# Patient Record
Sex: FEMALE | Race: WHITE | NOT HISPANIC OR LATINO | URBAN - METROPOLITAN AREA
[De-identification: names, ages, dates, MRNs, and addresses within clinical notes are randomized per-mention and may not be internally consistent; named-entity substitution may affect disease eponyms.]

---

## 2018-07-01 ENCOUNTER — OUTPATIENT (OUTPATIENT)
Dept: OUTPATIENT SERVICES | Facility: HOSPITAL | Age: 28
LOS: 1 days | End: 2018-07-01
Payer: MEDICAID

## 2018-07-01 PROCEDURE — G9001: CPT

## 2018-07-17 ENCOUNTER — EMERGENCY (EMERGENCY)
Age: 28
LOS: 1 days | Discharge: HOME | End: 2018-07-17
Admitting: EMERGENCY MEDICINE
Payer: MEDICAID

## 2018-07-17 ENCOUNTER — INPATIENT (INPATIENT)
Age: 28
LOS: 0 days | Discharge: HOME | End: 2018-07-18
Attending: NEUROLOGICAL SURGERY | Admitting: NEUROLOGICAL SURGERY

## 2018-07-17 VITALS
DIASTOLIC BLOOD PRESSURE: 70 MMHG | SYSTOLIC BLOOD PRESSURE: 98 MMHG | OXYGEN SATURATION: 99 % | TEMPERATURE: 99 F | RESPIRATION RATE: 16 BRPM | HEART RATE: 83 BPM

## 2018-07-17 VITALS
SYSTOLIC BLOOD PRESSURE: 107 MMHG | TEMPERATURE: 98 F | RESPIRATION RATE: 18 BRPM | OXYGEN SATURATION: 100 % | HEART RATE: 66 BPM | DIASTOLIC BLOOD PRESSURE: 69 MMHG

## 2018-07-17 PROCEDURE — 12001 RPR S/N/AX/GEN/TRNK 2.5CM/<: CPT | Mod: F6

## 2018-07-17 PROCEDURE — 99283 EMERGENCY DEPT VISIT LOW MDM: CPT | Mod: 25

## 2018-07-17 NOTE — ED PEDIATRIC TRIAGE NOTE - CHIEF COMPLAINT QUOTE
Patient bit by own pit-bull while attempting to free patient's daughter from pit-bull's bite. Per patient, dog "up to date on shots". Patient a&ox3, laceration sustained to base of right second finger, wrapped with gauze per EMS, dry and intact. No active bleeding. No PMH

## 2018-07-17 NOTE — ED ADULT TRIAGE NOTE - CHIEF COMPLAINT QUOTE
C/o laceration to base of right second finger after trying to separate her pitbull from biting her daughter. Dog is up to date with shots.

## 2018-07-18 DIAGNOSIS — T14.8XXA OTHER INJURY OF UNSPECIFIED BODY REGION, INITIAL ENCOUNTER: ICD-10-CM

## 2018-07-18 RX ADMIN — Medication 875 MILLIGRAM(S): at 00:47

## 2018-07-18 NOTE — ED PROVIDER NOTE - MEDICAL DECISION MAKING DETAILS
Pt p/w laceration to finger secondary to dog bite, copiously irrigated, Augmentin, loosely closed w/ [id number], encourage f/u in x2days.

## 2018-07-18 NOTE — ED PROVIDER NOTE - SKIN WOUND DESCRIPTION
1.5cm laceration to the base of the 2nd finger of the rt hand, no joint involvement, no signs of tendon injury, motor sensation intact, cap refill <2sec, FROM

## 2018-07-18 NOTE — ED PROVIDER NOTE - OBJECTIVE STATEMENT
28 y/o F w/ no significant PMHx presenting s/p dog bite to base of 2nd digit of the rt hand. Pt notes earlier tonight she was bitten by her domestic pitbull, vaccinations are UTD. Suffered laceration to finger, bleeding controlled. No weakness or numbness. No other injuries. Last Tetanus was [id number] ago. Pt is rt hand dominant.

## 2018-07-30 DIAGNOSIS — Z71.89 OTHER SPECIFIED COUNSELING: ICD-10-CM

## 2019-01-02 ENCOUNTER — APPOINTMENT (OUTPATIENT)
Dept: OBGYN | Facility: HOSPITAL | Age: 29
End: 2019-01-02

## 2020-05-08 ENCOUNTER — TRANSCRIPTION ENCOUNTER (OUTPATIENT)
Age: 30
End: 2020-05-08

## 2021-05-04 ENCOUNTER — APPOINTMENT (OUTPATIENT)
Dept: GASTROENTEROLOGY | Facility: CLINIC | Age: 31
End: 2021-05-04
Payer: MEDICAID

## 2021-05-04 VITALS
HEART RATE: 80 BPM | DIASTOLIC BLOOD PRESSURE: 60 MMHG | SYSTOLIC BLOOD PRESSURE: 100 MMHG | WEIGHT: 95.13 LBS | OXYGEN SATURATION: 97 % | HEIGHT: 61 IN | BODY MASS INDEX: 17.96 KG/M2 | TEMPERATURE: 98.4 F

## 2021-05-04 DIAGNOSIS — F41.8 OTHER SPECIFIED ANXIETY DISORDERS: ICD-10-CM

## 2021-05-04 DIAGNOSIS — Z80.1 FAMILY HISTORY OF MALIGNANT NEOPLASM OF TRACHEA, BRONCHUS AND LUNG: ICD-10-CM

## 2021-05-04 DIAGNOSIS — Z83.3 FAMILY HISTORY OF DIABETES MELLITUS: ICD-10-CM

## 2021-05-04 DIAGNOSIS — Z78.9 OTHER SPECIFIED HEALTH STATUS: ICD-10-CM

## 2021-05-04 DIAGNOSIS — Z80.0 FAMILY HISTORY OF MALIGNANT NEOPLASM OF DIGESTIVE ORGANS: ICD-10-CM

## 2021-05-04 DIAGNOSIS — R63.4 ABNORMAL WEIGHT LOSS: ICD-10-CM

## 2021-05-04 DIAGNOSIS — Z80.8 FAMILY HISTORY OF MALIGNANT NEOPLASM OF OTHER ORGANS OR SYSTEMS: ICD-10-CM

## 2021-05-04 DIAGNOSIS — Z83.79 FAMILY HISTORY OF OTHER DISEASES OF THE DIGESTIVE SYSTEM: ICD-10-CM

## 2021-05-04 DIAGNOSIS — Z82.49 FAMILY HISTORY OF ISCHEMIC HEART DISEASE AND OTHER DISEASES OF THE CIRCULATORY SYSTEM: ICD-10-CM

## 2021-05-04 DIAGNOSIS — Z80.7 FAMILY HISTORY OF OTHER MALIGNANT NEOPLASMS OF LYMPHOID, HEMATOPOIETIC AND RELATED TISSUES: ICD-10-CM

## 2021-05-04 PROCEDURE — 99072 ADDL SUPL MATRL&STAF TM PHE: CPT

## 2021-05-04 PROCEDURE — 99202 OFFICE O/P NEW SF 15 MIN: CPT

## 2021-05-04 NOTE — PHYSICAL EXAM
[General Appearance - Alert] : alert [General Appearance - Well Developed] : well developed [Sclera] : the sclera and conjunctiva were normal [PERRL With Normal Accommodation] : pupils were equal in size, round, and reactive to light [Extraocular Movements] : extraocular movements were intact [Outer Ear] : the ears and nose were normal in appearance [Oropharynx] : the oropharynx was normal [Neck Appearance] : the appearance of the neck was normal [Neck Cervical Mass (___cm)] : no neck mass was observed [Jugular Venous Distention Increased] : there was no jugular-venous distention [Thyroid Diffuse Enlargement] : the thyroid was not enlarged [Thyroid Nodule] : there were no palpable thyroid nodules [Auscultation Breath Sounds / Voice Sounds] : lungs were clear to auscultation bilaterally [Heart Rate And Rhythm] : heart rate was normal and rhythm regular [Heart Sounds] : normal S1 and S2 [Heart Sounds Gallop] : no gallops [Murmurs] : no murmurs [Heart Sounds Pericardial Friction Rub] : no pericardial rub [Full Pulse] : the pedal pulses are present [Edema] : there was no peripheral edema [Bowel Sounds] : normal bowel sounds [Abdomen Soft] : soft [Abdomen Tenderness] : non-tender [Abdomen Mass (___ Cm)] : no abdominal mass palpated [Cervical Lymph Nodes Enlarged Posterior Bilaterally] : posterior cervical [Cervical Lymph Nodes Enlarged Anterior Bilaterally] : anterior cervical [Supraclavicular Lymph Nodes Enlarged Bilaterally] : supraclavicular [Axillary Lymph Nodes Enlarged Bilaterally] : axillary [Femoral Lymph Nodes Enlarged Bilaterally] : femoral [Inguinal Lymph Nodes Enlarged Bilaterally] : inguinal [No CVA Tenderness] : no ~M costovertebral angle tenderness [No Spinal Tenderness] : no spinal tenderness [Abnormal Walk] : normal gait [Nail Clubbing] : no clubbing  or cyanosis of the fingernails [Musculoskeletal - Swelling] : no joint swelling seen [Motor Tone] : muscle strength and tone were normal [Skin Color & Pigmentation] : normal skin color and pigmentation [Skin Turgor] : normal skin turgor [] : no rash [Deep Tendon Reflexes (DTR)] : deep tendon reflexes were 2+ and symmetric [Sensation] : the sensory exam was normal to light touch and pinprick [No Focal Deficits] : no focal deficits [FreeTextEntry1] : Agitated mood as noted above

## 2021-05-04 NOTE — ASSESSMENT
[FreeTextEntry1] : Unexplained weight loss without any specific somatic symptoms.  No specific GI symptoms.  Suspect weight loss is due to anxiety and agitation/stress.  We will do blood work-up including thyroid function and celiac markers as well as C-reactive protein.  Will prescribe Xanax 0.5 mg 3 times daily as needed while awaiting results
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
## 2021-05-04 NOTE — HISTORY OF PRESENT ILLNESS
[Heartburn] : denies heartburn [Nausea] : denies nausea [Vomiting] : denies vomiting [Diarrhea] : denies diarrhea [Constipation] : denies constipation [Yellow Skin Or Eyes (Jaundice)] : denies jaundice [Abdominal Pain] : denies abdominal pain [Abdominal Swelling] : denies abdominal swelling [Rectal Pain] : denies rectal pain [Wt Loss ___ Lbs] : recent [unfilled] ~Upound(s) weight loss [GERD] : no gastroesophageal reflux disease [Peptic Ulcer Disease] : no peptic ulcer disease [Pancreatitis] : no pancreatitis [Inflammatory Bowel Disease] : no inflammatory bowel disease [Irritable Bowel Syndrome] : no irritable bowel syndrome [Diverticulitis] : no diverticulitis [Alcohol Abuse] : no alcohol abuse [de-identified] : 30-year-old female with no significant past medical history complains of 15 pound weight loss over the past 4 to 5 months.  Appetite is good states she is eating well.  Bowel movements are normal.  Denies abdominal pain dyspepsia nausea vomiting melena hematochezia.\par \par Patient has been under treatment distress and is visibly agitated and tearful.  Is a single parent with a young child\elvira \elvira Saw PMD recently no diagnosis made states labs including thyroid were normal.  Possibly elevated white blood cell count\elvira \elvira Had recently seen GYN diagnosed with abnormal cells of the cervix and was given an antibiotic

## 2021-05-05 LAB
ALBUMIN SERPL ELPH-MCNC: 4.9 G/DL
ALP BLD-CCNC: 65 U/L
ALT SERPL-CCNC: 17 U/L
ANION GAP SERPL CALC-SCNC: 12 MMOL/L
AST SERPL-CCNC: 16 U/L
BASOPHILS # BLD AUTO: 0.03 K/UL
BASOPHILS NFR BLD AUTO: 0.5 %
BILIRUB SERPL-MCNC: 0.2 MG/DL
BUN SERPL-MCNC: 11 MG/DL
CALCIUM SERPL-MCNC: 9.7 MG/DL
CHLORIDE SERPL-SCNC: 102 MMOL/L
CO2 SERPL-SCNC: 26 MMOL/L
CREAT SERPL-MCNC: 0.72 MG/DL
CRP SERPL-MCNC: <3 MG/L
EOSINOPHIL # BLD AUTO: 0.3 K/UL
EOSINOPHIL NFR BLD AUTO: 4.7 %
GLUCOSE SERPL-MCNC: 98 MG/DL
HCT VFR BLD CALC: 42.4 %
HGB BLD-MCNC: 13.5 G/DL
IMM GRANULOCYTES NFR BLD AUTO: 0.2 %
LYMPHOCYTES # BLD AUTO: 2.01 K/UL
LYMPHOCYTES NFR BLD AUTO: 31.5 %
MAN DIFF?: NORMAL
MCHC RBC-ENTMCNC: 29.9 PG
MCHC RBC-ENTMCNC: 31.8 GM/DL
MCV RBC AUTO: 94 FL
MONOCYTES # BLD AUTO: 0.37 K/UL
MONOCYTES NFR BLD AUTO: 5.8 %
NEUTROPHILS # BLD AUTO: 3.66 K/UL
NEUTROPHILS NFR BLD AUTO: 57.3 %
PLATELET # BLD AUTO: 413 K/UL
POTASSIUM SERPL-SCNC: 4.4 MMOL/L
PROT SERPL-MCNC: 7.2 G/DL
RBC # BLD: 4.51 M/UL
RBC # FLD: 11.9 %
SODIUM SERPL-SCNC: 139 MMOL/L
T3FREE SERPL-MCNC: 4.45 PG/ML
T4 FREE SERPL-MCNC: 1.5 NG/DL
TSH SERPL-ACNC: 2.13 UIU/ML
WBC # FLD AUTO: 6.38 K/UL

## 2021-05-06 LAB
GLIADIN IGA SER QL: <5 UNITS
GLIADIN IGG SER QL: <5 UNITS
GLIADIN PEPTIDE IGA SER-ACNC: NEGATIVE
GLIADIN PEPTIDE IGG SER-ACNC: NEGATIVE
TTG IGA SER IA-ACNC: <1.2 U/ML
TTG IGA SER-ACNC: NEGATIVE
TTG IGG SER IA-ACNC: <1.2 U/ML
TTG IGG SER IA-ACNC: NEGATIVE

## 2021-08-01 ENCOUNTER — RX RENEWAL (OUTPATIENT)
Age: 31
End: 2021-08-01

## 2021-08-30 ENCOUNTER — RX RENEWAL (OUTPATIENT)
Age: 31
End: 2021-08-30

## 2021-09-27 ENCOUNTER — RX RENEWAL (OUTPATIENT)
Age: 31
End: 2021-09-27

## 2021-10-28 ENCOUNTER — RX RENEWAL (OUTPATIENT)
Age: 31
End: 2021-10-28

## 2021-11-26 ENCOUNTER — RX RENEWAL (OUTPATIENT)
Age: 31
End: 2021-11-26

## 2021-12-27 ENCOUNTER — RX RENEWAL (OUTPATIENT)
Age: 31
End: 2021-12-27

## 2022-01-24 ENCOUNTER — RX RENEWAL (OUTPATIENT)
Age: 32
End: 2022-01-24

## 2022-02-21 ENCOUNTER — RX RENEWAL (OUTPATIENT)
Age: 32
End: 2022-02-21

## 2022-03-02 ENCOUNTER — NON-APPOINTMENT (OUTPATIENT)
Age: 32
End: 2022-03-02

## 2022-03-23 ENCOUNTER — RX RENEWAL (OUTPATIENT)
Age: 32
End: 2022-03-23

## 2022-04-21 ENCOUNTER — RX RENEWAL (OUTPATIENT)
Age: 32
End: 2022-04-21

## 2022-05-18 ENCOUNTER — RX RENEWAL (OUTPATIENT)
Age: 32
End: 2022-05-18

## 2022-06-17 ENCOUNTER — RX RENEWAL (OUTPATIENT)
Age: 32
End: 2022-06-17

## 2022-07-15 ENCOUNTER — RX RENEWAL (OUTPATIENT)
Age: 32
End: 2022-07-15

## 2022-08-11 ENCOUNTER — RX RENEWAL (OUTPATIENT)
Age: 32
End: 2022-08-11

## 2022-09-07 ENCOUNTER — RX RENEWAL (OUTPATIENT)
Age: 32
End: 2022-09-07

## 2022-10-04 ENCOUNTER — RX RENEWAL (OUTPATIENT)
Age: 32
End: 2022-10-04

## 2022-10-31 ENCOUNTER — RX RENEWAL (OUTPATIENT)
Age: 32
End: 2022-10-31

## 2022-11-27 ENCOUNTER — RX RENEWAL (OUTPATIENT)
Age: 32
End: 2022-11-27

## 2022-12-27 ENCOUNTER — RX RENEWAL (OUTPATIENT)
Age: 32
End: 2022-12-27

## 2023-01-27 ENCOUNTER — RX RENEWAL (OUTPATIENT)
Age: 33
End: 2023-01-27

## 2023-02-23 ENCOUNTER — RX RENEWAL (OUTPATIENT)
Age: 33
End: 2023-02-23

## 2023-03-22 ENCOUNTER — RX RENEWAL (OUTPATIENT)
Age: 33
End: 2023-03-22

## 2023-04-18 ENCOUNTER — RX RENEWAL (OUTPATIENT)
Age: 33
End: 2023-04-18

## 2023-05-15 ENCOUNTER — RX RENEWAL (OUTPATIENT)
Age: 33
End: 2023-05-15

## 2023-05-23 ENCOUNTER — APPOINTMENT (OUTPATIENT)
Dept: GASTROENTEROLOGY | Facility: CLINIC | Age: 33
End: 2023-05-23

## 2023-06-12 ENCOUNTER — RX RENEWAL (OUTPATIENT)
Age: 33
End: 2023-06-12

## 2023-07-10 ENCOUNTER — RX RENEWAL (OUTPATIENT)
Age: 33
End: 2023-07-10

## 2023-08-07 ENCOUNTER — RX RENEWAL (OUTPATIENT)
Age: 33
End: 2023-08-07

## 2023-08-07 DIAGNOSIS — R10.13 EPIGASTRIC PAIN: ICD-10-CM

## 2023-09-06 ENCOUNTER — RX RENEWAL (OUTPATIENT)
Age: 33
End: 2023-09-06

## 2023-10-03 ENCOUNTER — RX RENEWAL (OUTPATIENT)
Age: 33
End: 2023-10-03

## 2023-11-02 ENCOUNTER — RX RENEWAL (OUTPATIENT)
Age: 33
End: 2023-11-02

## 2023-11-29 ENCOUNTER — RX RENEWAL (OUTPATIENT)
Age: 33
End: 2023-11-29

## 2023-12-26 ENCOUNTER — RX RENEWAL (OUTPATIENT)
Age: 33
End: 2023-12-26

## 2024-01-22 ENCOUNTER — RX RENEWAL (OUTPATIENT)
Age: 34
End: 2024-01-22

## 2024-02-19 ENCOUNTER — RX RENEWAL (OUTPATIENT)
Age: 34
End: 2024-02-19

## 2024-03-27 ENCOUNTER — RX RENEWAL (OUTPATIENT)
Age: 34
End: 2024-03-27

## 2024-04-30 ENCOUNTER — RX RENEWAL (OUTPATIENT)
Age: 34
End: 2024-04-30

## 2024-05-29 ENCOUNTER — RX RENEWAL (OUTPATIENT)
Age: 34
End: 2024-05-29

## 2024-06-25 ENCOUNTER — RX RENEWAL (OUTPATIENT)
Age: 34
End: 2024-06-25

## 2024-06-25 RX ORDER — ALPRAZOLAM 0.5 MG/1
0.5 TABLET ORAL
Qty: 90 | Refills: 0 | Status: ACTIVE | COMMUNITY
Start: 2021-05-04 | End: 1900-01-01

## 2024-07-22 ENCOUNTER — RX RENEWAL (OUTPATIENT)
Age: 34
End: 2024-07-22

## 2024-08-23 ENCOUNTER — RX RENEWAL (OUTPATIENT)
Age: 34
End: 2024-08-23

## 2024-09-23 ENCOUNTER — RX RENEWAL (OUTPATIENT)
Age: 34
End: 2024-09-23

## 2024-10-28 ENCOUNTER — RX RENEWAL (OUTPATIENT)
Age: 34
End: 2024-10-28

## 2025-06-11 ENCOUNTER — RX RENEWAL (OUTPATIENT)
Age: 35
End: 2025-06-11

## 2025-07-21 ENCOUNTER — NON-APPOINTMENT (OUTPATIENT)
Age: 35
End: 2025-07-21

## 2025-07-25 ENCOUNTER — RX RENEWAL (OUTPATIENT)
Age: 35
End: 2025-07-25